# Patient Record
Sex: MALE
[De-identification: names, ages, dates, MRNs, and addresses within clinical notes are randomized per-mention and may not be internally consistent; named-entity substitution may affect disease eponyms.]

---

## 2018-08-26 ENCOUNTER — HOSPITAL ENCOUNTER (INPATIENT)
Dept: HOSPITAL 93 - ER | Age: 53
LOS: 16 days | Discharge: HOME HEALTH | DRG: 572 | End: 2018-09-11
Attending: INTERNAL MEDICINE | Admitting: INTERNAL MEDICINE
Payer: COMMERCIAL

## 2018-08-26 VITALS — BODY MASS INDEX: 34.86 KG/M2 | HEIGHT: 68 IN | WEIGHT: 230 LBS

## 2018-08-26 DIAGNOSIS — E78.4: ICD-10-CM

## 2018-08-26 DIAGNOSIS — E11.22: ICD-10-CM

## 2018-08-26 DIAGNOSIS — B96.89: ICD-10-CM

## 2018-08-26 DIAGNOSIS — I12.9: ICD-10-CM

## 2018-08-26 DIAGNOSIS — E11.65: ICD-10-CM

## 2018-08-26 DIAGNOSIS — N18.3: ICD-10-CM

## 2018-08-26 DIAGNOSIS — L03.116: Primary | ICD-10-CM

## 2018-08-26 DIAGNOSIS — Z95.1: ICD-10-CM

## 2018-08-26 DIAGNOSIS — E66.8: ICD-10-CM

## 2018-08-26 PROCEDURE — B54CZZZ ULTRASONOGRAPHY OF LEFT LOWER EXTREMITY VEINS: ICD-10-PCS | Performed by: INTERNAL MEDICINE

## 2018-08-26 PROCEDURE — 8E0ZXY6 ISOLATION: ICD-10-PCS | Performed by: INTERNAL MEDICINE

## 2018-08-27 PROCEDURE — BQ2SZZZ COMPUTERIZED TOMOGRAPHY (CT SCAN) OF LEFT LOWER EXTREMITY: ICD-10-PCS | Performed by: INTERNAL MEDICINE

## 2018-08-30 PROCEDURE — 0JBP0ZZ EXCISION OF LEFT LOWER LEG SUBCUTANEOUS TISSUE AND FASCIA, OPEN APPROACH: ICD-10-PCS | Performed by: SURGERY

## 2018-08-31 PROCEDURE — 05H333Z INSERTION OF INFUSION DEVICE INTO RIGHT INNOMINATE VEIN, PERCUTANEOUS APPROACH: ICD-10-PCS | Performed by: INTERNAL MEDICINE

## 2018-09-10 PROCEDURE — 0JBP0ZZ EXCISION OF LEFT LOWER LEG SUBCUTANEOUS TISSUE AND FASCIA, OPEN APPROACH: ICD-10-PCS | Performed by: SURGERY

## 2018-09-27 ENCOUNTER — HOSPITAL ENCOUNTER (INPATIENT)
Dept: HOSPITAL 93 - ER | Age: 53
LOS: 5 days | Discharge: HOME HEALTH | DRG: 572 | End: 2018-10-02
Attending: INTERNAL MEDICINE | Admitting: INTERNAL MEDICINE
Payer: COMMERCIAL

## 2018-09-27 VITALS — HEIGHT: 68 IN | WEIGHT: 230 LBS | BODY MASS INDEX: 34.86 KG/M2

## 2018-09-27 DIAGNOSIS — E11.9: ICD-10-CM

## 2018-09-27 DIAGNOSIS — Z16.12: ICD-10-CM

## 2018-09-27 DIAGNOSIS — I10: ICD-10-CM

## 2018-09-27 DIAGNOSIS — Z95.1: ICD-10-CM

## 2018-09-27 DIAGNOSIS — E66.8: ICD-10-CM

## 2018-09-27 DIAGNOSIS — B96.29: ICD-10-CM

## 2018-09-27 DIAGNOSIS — L97.828: Primary | ICD-10-CM

## 2018-09-27 PROCEDURE — 5A09557 ASSISTANCE WITH RESPIRATORY VENTILATION, GREATER THAN 96 CONSECUTIVE HOURS, CONTINUOUS POSITIVE AIRWAY PRESSURE: ICD-10-PCS | Performed by: INTERNAL MEDICINE

## 2018-09-28 PROCEDURE — 8E0ZXY6 ISOLATION: ICD-10-PCS | Performed by: INTERNAL MEDICINE

## 2018-09-28 PROCEDURE — 0JBP0ZZ EXCISION OF LEFT LOWER LEG SUBCUTANEOUS TISSUE AND FASCIA, OPEN APPROACH: ICD-10-PCS | Performed by: SURGERY

## 2018-10-01 PROCEDURE — 02HV33Z INSERTION OF INFUSION DEVICE INTO SUPERIOR VENA CAVA, PERCUTANEOUS APPROACH: ICD-10-PCS | Performed by: INTERNAL MEDICINE

## 2022-05-26 ENCOUNTER — HOSPITAL ENCOUNTER (INPATIENT)
Dept: HOSPITAL 93 - ER | Age: 57
LOS: 12 days | Discharge: HOME | DRG: 809 | End: 2022-06-07
Attending: INTERNAL MEDICINE | Admitting: INTERNAL MEDICINE
Payer: COMMERCIAL

## 2022-05-26 VITALS — BODY MASS INDEX: 37.89 KG/M2 | WEIGHT: 250 LBS | HEIGHT: 68 IN

## 2022-05-26 DIAGNOSIS — D61.810: Primary | ICD-10-CM

## 2022-05-26 DIAGNOSIS — L97.819: ICD-10-CM

## 2022-05-26 DIAGNOSIS — L89.159: ICD-10-CM

## 2022-05-26 DIAGNOSIS — I11.9: ICD-10-CM

## 2022-05-26 DIAGNOSIS — D63.0: ICD-10-CM

## 2022-05-26 DIAGNOSIS — Z74.01: ICD-10-CM

## 2022-05-26 DIAGNOSIS — G89.3: ICD-10-CM

## 2022-05-26 DIAGNOSIS — Z79.84: ICD-10-CM

## 2022-05-26 DIAGNOSIS — D50.0: ICD-10-CM

## 2022-05-26 DIAGNOSIS — Z93.2: ICD-10-CM

## 2022-05-26 DIAGNOSIS — E46: ICD-10-CM

## 2022-05-26 DIAGNOSIS — L97.829: ICD-10-CM

## 2022-05-26 DIAGNOSIS — I25.10: ICD-10-CM

## 2022-05-26 DIAGNOSIS — I83.018: ICD-10-CM

## 2022-05-26 DIAGNOSIS — Z79.4: ICD-10-CM

## 2022-05-26 DIAGNOSIS — D64.9: ICD-10-CM

## 2022-05-26 DIAGNOSIS — Z53.29: ICD-10-CM

## 2022-05-26 DIAGNOSIS — T45.1X5A: ICD-10-CM

## 2022-05-26 DIAGNOSIS — E77.8: ICD-10-CM

## 2022-05-26 DIAGNOSIS — D70.1: ICD-10-CM

## 2022-05-26 DIAGNOSIS — I83.028: ICD-10-CM

## 2022-05-26 DIAGNOSIS — E11.51: ICD-10-CM

## 2022-05-26 DIAGNOSIS — E66.01: ICD-10-CM

## 2022-05-26 DIAGNOSIS — C90.00: ICD-10-CM

## 2022-05-26 DIAGNOSIS — D61.818: ICD-10-CM

## 2022-05-27 PROCEDURE — 30233N1 TRANSFUSION OF NONAUTOLOGOUS RED BLOOD CELLS INTO PERIPHERAL VEIN, PERCUTANEOUS APPROACH: ICD-10-PCS | Performed by: INTERNAL MEDICINE

## 2022-05-27 PROCEDURE — 4A12X45 MONITORING OF CARDIAC ELECTRICAL ACTIVITY, AMBULATORY, EXTERNAL APPROACH: ICD-10-PCS | Performed by: INTERNAL MEDICINE

## 2022-05-27 PROCEDURE — 3E10X8Z IRRIGATION OF SKIN AND MUCOUS MEMBRANES USING IRRIGATING SUBSTANCE: ICD-10-PCS | Performed by: INTERNAL MEDICINE

## 2022-05-28 PROCEDURE — B24BYZZ ULTRASONOGRAPHY OF HEART WITH AORTA USING OTHER CONTRAST: ICD-10-PCS | Performed by: INTERNAL MEDICINE

## 2022-05-31 PROCEDURE — 02H633Z INSERTION OF INFUSION DEVICE INTO RIGHT ATRIUM, PERCUTANEOUS APPROACH: ICD-10-PCS | Performed by: RADIOLOGY

## 2022-08-12 ENCOUNTER — HOSPITAL ENCOUNTER (INPATIENT)
Dept: HOSPITAL 93 - ER | Age: 57
LOS: 13 days | Discharge: HOME | DRG: 812 | End: 2022-08-25
Attending: INTERNAL MEDICINE | Admitting: INTERNAL MEDICINE
Payer: COMMERCIAL

## 2022-08-12 VITALS — WEIGHT: 315 LBS | BODY MASS INDEX: 47.74 KG/M2 | HEIGHT: 68 IN

## 2022-08-12 DIAGNOSIS — D64.9: ICD-10-CM

## 2022-08-12 DIAGNOSIS — Z93.3: ICD-10-CM

## 2022-08-12 DIAGNOSIS — Z74.01: ICD-10-CM

## 2022-08-12 DIAGNOSIS — E66.01: ICD-10-CM

## 2022-08-12 DIAGNOSIS — C90.00: ICD-10-CM

## 2022-08-12 DIAGNOSIS — L89.899: ICD-10-CM

## 2022-08-12 DIAGNOSIS — N39.0: ICD-10-CM

## 2022-08-12 DIAGNOSIS — L89.159: ICD-10-CM

## 2022-08-12 DIAGNOSIS — D50.0: Primary | ICD-10-CM

## 2022-08-12 DIAGNOSIS — Z79.84: ICD-10-CM

## 2022-08-12 DIAGNOSIS — T83.518A: ICD-10-CM

## 2022-08-12 DIAGNOSIS — E11.51: ICD-10-CM

## 2022-08-12 DIAGNOSIS — D64.81: ICD-10-CM

## 2022-08-12 DIAGNOSIS — Z53.29: ICD-10-CM

## 2022-08-12 DIAGNOSIS — I11.9: ICD-10-CM

## 2022-08-12 DIAGNOSIS — B96.1: ICD-10-CM

## 2022-08-13 PROCEDURE — 8E0ZXY6 ISOLATION: ICD-10-PCS | Performed by: INTERNAL MEDICINE

## 2022-08-15 PROCEDURE — 05HY33Z INSERTION OF INFUSION DEVICE INTO UPPER VEIN, PERCUTANEOUS APPROACH: ICD-10-PCS | Performed by: INTERNAL MEDICINE

## 2022-08-15 PROCEDURE — 30233N1 TRANSFUSION OF NONAUTOLOGOUS RED BLOOD CELLS INTO PERIPHERAL VEIN, PERCUTANEOUS APPROACH: ICD-10-PCS | Performed by: INTERNAL MEDICINE

## 2022-08-18 PROCEDURE — 4A12X4Z MONITORING OF CARDIAC ELECTRICAL ACTIVITY, EXTERNAL APPROACH: ICD-10-PCS | Performed by: INTERNAL MEDICINE

## 2023-01-31 ENCOUNTER — HOSPITAL ENCOUNTER (INPATIENT)
Dept: HOSPITAL 93 - ER | Age: 58
LOS: 4 days | Discharge: HOME | DRG: 841 | End: 2023-02-04
Attending: INTERNAL MEDICINE | Admitting: INTERNAL MEDICINE
Payer: COMMERCIAL

## 2023-01-31 VITALS — WEIGHT: 240 LBS | HEIGHT: 68 IN | BODY MASS INDEX: 36.37 KG/M2

## 2023-01-31 DIAGNOSIS — I11.9: ICD-10-CM

## 2023-01-31 DIAGNOSIS — I73.9: ICD-10-CM

## 2023-01-31 DIAGNOSIS — L08.89: ICD-10-CM

## 2023-01-31 DIAGNOSIS — L97.818: ICD-10-CM

## 2023-01-31 DIAGNOSIS — Z20.822: ICD-10-CM

## 2023-01-31 DIAGNOSIS — I87.2: ICD-10-CM

## 2023-01-31 DIAGNOSIS — D50.0: ICD-10-CM

## 2023-01-31 DIAGNOSIS — D63.1: ICD-10-CM

## 2023-01-31 DIAGNOSIS — E78.5: ICD-10-CM

## 2023-01-31 DIAGNOSIS — D63.0: ICD-10-CM

## 2023-01-31 DIAGNOSIS — I25.10: ICD-10-CM

## 2023-01-31 DIAGNOSIS — Z74.01: ICD-10-CM

## 2023-01-31 DIAGNOSIS — B96.4: ICD-10-CM

## 2023-01-31 DIAGNOSIS — B95.61: ICD-10-CM

## 2023-01-31 DIAGNOSIS — C90.00: Primary | ICD-10-CM

## 2023-01-31 DIAGNOSIS — Z95.1: ICD-10-CM

## 2023-01-31 NOTE — NUR
SE RECIBE PTE ALERTA Y ORIENTADO X 3 ESFERAS SIN DIFICULTAD RESP EN AMBULANCIA
POR ANEMIA REFERIDO POR DRA LINDSAY MCMANUS.PTE CON WOODWARD TRAIDO DEL HOGAR QUE PRSENTA
ORINA CHEMA,OSTOMIA EN LADO LT.SE LE OFRECE [place] MEDICO EL CUAL REFIERE ENTENDER
IMCLUYENDO TRANSUSION E INDICA DESEA SE LE REALICE CARI LINEA CENTRAL ANTES
DE [place] MEDICO.SE LE NOTIFICA A DR POLLARD,SE REALIZA EKG PENDIENTE.

## 2023-01-31 NOTE — NUR
1045PM PTE AUTORIZA A VALERI MUESTRAS,SE REALIZA BAJO MEDIDAS ASEPTICAS.SE EVELYNE
TUBOS PILOTOS PARA 4 UNIDADES DE PRBC NOTIFICADAS A MS GARCIA DE [facility]
AUXILIO MUTUO.

## 2023-02-01 PROCEDURE — 30233N1 TRANSFUSION OF NONAUTOLOGOUS RED BLOOD CELLS INTO PERIPHERAL VEIN, PERCUTANEOUS APPROACH: ICD-10-PCS | Performed by: EMERGENCY MEDICINE

## 2023-02-01 PROCEDURE — 06HM33Z INSERTION OF INFUSION DEVICE INTO RIGHT FEMORAL VEIN, PERCUTANEOUS APPROACH: ICD-10-PCS | Performed by: SURGERY

## 2023-02-21 ENCOUNTER — HOSPITAL ENCOUNTER (INPATIENT)
Dept: HOSPITAL 93 - ER | Age: 58
LOS: 11 days | Discharge: HOME | DRG: 871 | End: 2023-03-04
Attending: INTERNAL MEDICINE | Admitting: INTERNAL MEDICINE
Payer: COMMERCIAL

## 2023-02-21 VITALS — WEIGHT: 300 LBS | BODY MASS INDEX: 58.9 KG/M2 | HEIGHT: 60 IN

## 2023-02-21 DIAGNOSIS — A41.1: Primary | ICD-10-CM

## 2023-02-21 DIAGNOSIS — E11.65: ICD-10-CM

## 2023-02-21 DIAGNOSIS — Z92.21: ICD-10-CM

## 2023-02-21 DIAGNOSIS — L89.629: ICD-10-CM

## 2023-02-21 DIAGNOSIS — E87.5: ICD-10-CM

## 2023-02-21 DIAGNOSIS — N17.8: ICD-10-CM

## 2023-02-21 DIAGNOSIS — I87.2: ICD-10-CM

## 2023-02-21 DIAGNOSIS — D64.81: ICD-10-CM

## 2023-02-21 DIAGNOSIS — B37.7: ICD-10-CM

## 2023-02-21 DIAGNOSIS — Z74.01: ICD-10-CM

## 2023-02-21 DIAGNOSIS — Z79.84: ICD-10-CM

## 2023-02-21 DIAGNOSIS — D50.0: ICD-10-CM

## 2023-02-21 DIAGNOSIS — L03.116: ICD-10-CM

## 2023-02-21 DIAGNOSIS — R57.1: ICD-10-CM

## 2023-02-21 DIAGNOSIS — E11.21: ICD-10-CM

## 2023-02-21 DIAGNOSIS — Z95.1: ICD-10-CM

## 2023-02-21 DIAGNOSIS — D69.59: ICD-10-CM

## 2023-02-21 DIAGNOSIS — B96.1: ICD-10-CM

## 2023-02-21 DIAGNOSIS — R65.21: ICD-10-CM

## 2023-02-21 DIAGNOSIS — Z92.3: ICD-10-CM

## 2023-02-21 DIAGNOSIS — E66.01: ICD-10-CM

## 2023-02-21 DIAGNOSIS — Z79.4: ICD-10-CM

## 2023-02-21 DIAGNOSIS — E87.1: ICD-10-CM

## 2023-02-21 DIAGNOSIS — I25.810: ICD-10-CM

## 2023-02-21 DIAGNOSIS — B37.49: ICD-10-CM

## 2023-02-21 DIAGNOSIS — D70.1: ICD-10-CM

## 2023-02-21 DIAGNOSIS — I11.9: ICD-10-CM

## 2023-02-21 DIAGNOSIS — A41.89: ICD-10-CM

## 2023-02-21 DIAGNOSIS — L89.159: ICD-10-CM

## 2023-02-21 DIAGNOSIS — D63.0: ICD-10-CM

## 2023-02-21 DIAGNOSIS — C90.00: ICD-10-CM

## 2023-02-21 DIAGNOSIS — I95.89: ICD-10-CM

## 2023-02-21 DIAGNOSIS — L98.499: ICD-10-CM

## 2023-02-21 DIAGNOSIS — Z93.3: ICD-10-CM

## 2023-02-21 DIAGNOSIS — D61.818: ICD-10-CM

## 2023-02-21 PROCEDURE — 4A12X4Z MONITORING OF CARDIAC ELECTRICAL ACTIVITY, EXTERNAL APPROACH: ICD-10-PCS | Performed by: INTERNAL MEDICINE

## 2023-02-21 PROCEDURE — 3E0F7SF INTRODUCTION OF OTHER GAS INTO RESPIRATORY TRACT, VIA NATURAL OR ARTIFICIAL OPENING: ICD-10-PCS | Performed by: INTERNAL MEDICINE

## 2023-02-21 PROCEDURE — 30233N1 TRANSFUSION OF NONAUTOLOGOUS RED BLOOD CELLS INTO PERIPHERAL VEIN, PERCUTANEOUS APPROACH: ICD-10-PCS | Performed by: INTERNAL MEDICINE

## 2023-02-24 PROCEDURE — 30233R1 TRANSFUSION OF NONAUTOLOGOUS PLATELETS INTO PERIPHERAL VEIN, PERCUTANEOUS APPROACH: ICD-10-PCS | Performed by: INTERNAL MEDICINE

## 2023-02-27 PROCEDURE — 06HM33Z INSERTION OF INFUSION DEVICE INTO RIGHT FEMORAL VEIN, PERCUTANEOUS APPROACH: ICD-10-PCS | Performed by: SURGERY

## 2023-03-16 ENCOUNTER — HOSPITAL ENCOUNTER (INPATIENT)
Dept: HOSPITAL 93 - ER | Age: 58
LOS: 28 days | Discharge: HOME | DRG: 264 | End: 2023-04-13
Attending: INTERNAL MEDICINE | Admitting: INTERNAL MEDICINE
Payer: COMMERCIAL

## 2023-03-16 VITALS — WEIGHT: 300 LBS | BODY MASS INDEX: 45.47 KG/M2 | HEIGHT: 68 IN

## 2023-03-16 DIAGNOSIS — B96.1: ICD-10-CM

## 2023-03-16 DIAGNOSIS — Z16.30: ICD-10-CM

## 2023-03-16 DIAGNOSIS — D50.0: ICD-10-CM

## 2023-03-16 DIAGNOSIS — Z79.4: ICD-10-CM

## 2023-03-16 DIAGNOSIS — G89.3: ICD-10-CM

## 2023-03-16 DIAGNOSIS — E11.622: ICD-10-CM

## 2023-03-16 DIAGNOSIS — D63.0: ICD-10-CM

## 2023-03-16 DIAGNOSIS — B95.2: ICD-10-CM

## 2023-03-16 DIAGNOSIS — B96.4: ICD-10-CM

## 2023-03-16 DIAGNOSIS — I96: ICD-10-CM

## 2023-03-16 DIAGNOSIS — I12.9: ICD-10-CM

## 2023-03-16 DIAGNOSIS — D70.1: ICD-10-CM

## 2023-03-16 DIAGNOSIS — E66.01: ICD-10-CM

## 2023-03-16 DIAGNOSIS — L97.519: ICD-10-CM

## 2023-03-16 DIAGNOSIS — C90.30: ICD-10-CM

## 2023-03-16 DIAGNOSIS — N18.9: ICD-10-CM

## 2023-03-16 DIAGNOSIS — E11.22: ICD-10-CM

## 2023-03-16 DIAGNOSIS — Z53.29: ICD-10-CM

## 2023-03-16 DIAGNOSIS — E11.52: Primary | ICD-10-CM

## 2023-03-16 DIAGNOSIS — Z79.899: ICD-10-CM

## 2023-03-16 DIAGNOSIS — E11.65: ICD-10-CM

## 2023-03-16 DIAGNOSIS — D84.821: ICD-10-CM

## 2023-03-16 DIAGNOSIS — L97.529: ICD-10-CM

## 2023-03-16 DIAGNOSIS — L03.113: ICD-10-CM

## 2023-03-16 DIAGNOSIS — Z93.3: ICD-10-CM

## 2023-03-16 DIAGNOSIS — L89.159: ICD-10-CM

## 2023-03-16 DIAGNOSIS — Z95.5: ICD-10-CM

## 2023-03-16 DIAGNOSIS — Z74.01: ICD-10-CM

## 2023-03-16 DIAGNOSIS — L98.498: ICD-10-CM

## 2023-03-16 DIAGNOSIS — B96.89: ICD-10-CM

## 2023-03-16 DIAGNOSIS — N39.0: ICD-10-CM

## 2023-03-16 DIAGNOSIS — N17.9: ICD-10-CM

## 2023-03-16 PROCEDURE — B54MZZZ ULTRASONOGRAPHY OF RIGHT UPPER EXTREMITY VEINS: ICD-10-PCS | Performed by: INTERNAL MEDICINE

## 2023-03-17 PROCEDURE — 06HM33Z INSERTION OF INFUSION DEVICE INTO RIGHT FEMORAL VEIN, PERCUTANEOUS APPROACH: ICD-10-PCS | Performed by: SURGERY

## 2023-03-18 PROCEDURE — 0JBG0ZZ EXCISION OF RIGHT LOWER ARM SUBCUTANEOUS TISSUE AND FASCIA, OPEN APPROACH: ICD-10-PCS | Performed by: SURGERY

## 2023-03-22 PROCEDURE — 0JBG0ZZ EXCISION OF RIGHT LOWER ARM SUBCUTANEOUS TISSUE AND FASCIA, OPEN APPROACH: ICD-10-PCS | Performed by: SURGERY

## 2023-03-29 PROCEDURE — 0JDG0ZZ EXTRACTION OF RIGHT LOWER ARM SUBCUTANEOUS TISSUE AND FASCIA, OPEN APPROACH: ICD-10-PCS | Performed by: SURGERY

## 2023-04-03 PROCEDURE — 30233N1 TRANSFUSION OF NONAUTOLOGOUS RED BLOOD CELLS INTO PERIPHERAL VEIN, PERCUTANEOUS APPROACH: ICD-10-PCS | Performed by: INTERNAL MEDICINE

## 2023-04-04 PROCEDURE — 0JBG0ZZ EXCISION OF RIGHT LOWER ARM SUBCUTANEOUS TISSUE AND FASCIA, OPEN APPROACH: ICD-10-PCS | Performed by: SURGERY

## 2023-04-05 PROCEDURE — 0JDG0ZZ EXTRACTION OF RIGHT LOWER ARM SUBCUTANEOUS TISSUE AND FASCIA, OPEN APPROACH: ICD-10-PCS | Performed by: SURGERY

## 2023-04-05 PROCEDURE — 2W1CX6Z COMPRESSION OF RIGHT LOWER ARM USING PRESSURE DRESSING: ICD-10-PCS | Performed by: INTERNAL MEDICINE

## 2023-04-10 PROCEDURE — 2W0CX6Z CHANGE PRESSURE DRESSING ON RIGHT LOWER ARM: ICD-10-PCS | Performed by: INTERNAL MEDICINE

## 2023-04-12 PROCEDURE — 0JDG0ZZ EXTRACTION OF RIGHT LOWER ARM SUBCUTANEOUS TISSUE AND FASCIA, OPEN APPROACH: ICD-10-PCS | Performed by: SURGERY

## 2023-07-22 ENCOUNTER — HOSPITAL ENCOUNTER (INPATIENT)
Dept: HOSPITAL 93 - ER | Age: 58
LOS: 7 days | Discharge: HOME | DRG: 871 | End: 2023-07-29
Attending: GENERAL PRACTICE | Admitting: GENERAL PRACTICE
Payer: COMMERCIAL

## 2023-07-22 DIAGNOSIS — B37.49: ICD-10-CM

## 2023-07-22 DIAGNOSIS — R65.21: ICD-10-CM

## 2023-07-22 DIAGNOSIS — E03.9: ICD-10-CM

## 2023-07-22 DIAGNOSIS — L98.499: ICD-10-CM

## 2023-07-22 DIAGNOSIS — B37.7: ICD-10-CM

## 2023-07-22 DIAGNOSIS — E11.22: ICD-10-CM

## 2023-07-22 DIAGNOSIS — E66.01: ICD-10-CM

## 2023-07-22 DIAGNOSIS — N18.9: ICD-10-CM

## 2023-07-22 DIAGNOSIS — C90.00: ICD-10-CM

## 2023-07-22 DIAGNOSIS — N50.89: ICD-10-CM

## 2023-07-22 DIAGNOSIS — Z79.4: ICD-10-CM

## 2023-07-22 DIAGNOSIS — E83.42: ICD-10-CM

## 2023-07-22 DIAGNOSIS — I13.10: ICD-10-CM

## 2023-07-22 DIAGNOSIS — E11.628: ICD-10-CM

## 2023-07-22 DIAGNOSIS — L89.154: ICD-10-CM

## 2023-07-22 DIAGNOSIS — D63.0: ICD-10-CM

## 2023-07-22 DIAGNOSIS — Z93.3: ICD-10-CM

## 2023-07-22 DIAGNOSIS — E11.622: ICD-10-CM

## 2023-07-22 DIAGNOSIS — Z74.01: ICD-10-CM

## 2023-07-22 DIAGNOSIS — A41.51: Primary | ICD-10-CM

## 2023-07-22 DIAGNOSIS — D50.0: ICD-10-CM

## 2023-07-22 DIAGNOSIS — E88.09: ICD-10-CM

## 2023-07-22 DIAGNOSIS — N17.8: ICD-10-CM

## 2023-07-22 DIAGNOSIS — Z92.21: ICD-10-CM

## 2023-07-22 DIAGNOSIS — E11.65: ICD-10-CM

## 2023-07-23 PROCEDURE — 4A12X4Z MONITORING OF CARDIAC ELECTRICAL ACTIVITY, EXTERNAL APPROACH: ICD-10-PCS | Performed by: INTERNAL MEDICINE

## 2023-07-23 PROCEDURE — 3E0F7SF INTRODUCTION OF OTHER GAS INTO RESPIRATORY TRACT, VIA NATURAL OR ARTIFICIAL OPENING: ICD-10-PCS | Performed by: INTERNAL MEDICINE

## 2023-07-23 PROCEDURE — 30243N1 TRANSFUSION OF NONAUTOLOGOUS RED BLOOD CELLS INTO CENTRAL VEIN, PERCUTANEOUS APPROACH: ICD-10-PCS | Performed by: INTERNAL MEDICINE

## 2023-07-23 NOTE — NUR
SE LIDA REQUISICION DE DEVAN EN LABORATORIO. SE LLAMA A [facility] Y SE
NOTIFICA A MS GARCIA 3:25AM QUE SE DEJO REQUISICION EN EL LABORATORIO.

## 2023-07-23 NOTE — NUR
7:00AM
SE RECIBE PTE ALERTA Y ORIENTADO X3 EN CAMA BAJA CON BARANDA ELEVADAS. PTE
CANALIZADO EN EL LADO DERECHO CON ANGIO #20 RECIBIENDO LEVOPHED 8MG/250ML
BAJANDO A 10ML/HR Y ANGIO #22 RECIBIENDO .9NSS BAJANDO A 120ML/HR.
EXTREMIDADES SUPERIORES CON EDEMA Y ULCERA EN BRAZO DERECHO DERECHO CONECTADA A
SUCCION. PTE CONECTADO A MONITOR CARDIACO HR:76LAT/ MIN SAT:99 RR:13. ABDOMEN
BLANDO AL TACTO. VENDAJE EN EL CENTRO DEL PECHO LIMPIO Y SECO. PTE CON
COLONOSTOMIA EN LUQ. SE OBSEWRVA WOODWARD DRENANDO ORINA COLOR AMRILLO TURBIO CON
SEDIMENTACIO CON IRRIGACION. EXTREMIDADES INFERIORES CON EDEMAS, ERITEMAS,
CALIENTE AL TACTO Y ELIMINANDO SECRECIONES COLOR AMARILLO, FETIDO. SE MIDEN S/V
Y SE REPORTAN. PACIENTE EN ESPERA POR EL DR. TEETEE ANDRADE.

## 2023-07-23 NOTE — NUR
SE ORIENTA A PACIENTE SOBRE ORDEN MEDICA DE TRANFUSION EL MISMO REHUSA
TRANFUSION HASTA QUE MEDICO DE TURNO SE COMUNICO CON PASTRANA ONCOLOGA. SE LE
NOTIFICA A

## 2023-07-23 NOTE — NUR
SE LLAMA A [facility] A NOTIFICAR CAMBIO EN ORDEN MEDIA MS LYLY ME
NOTIFICA QUE PACIENTE ES A POSITIVO QUE NO SE UTILIZAN GRUPOS ALTERNOS.

## 2023-07-23 NOTE — NUR
SE RECIBE PTE ALERTA Y ORIENTADO EN JUVENTINO SHIRA ESFERAS, EL CUAL LLEGA EN
AMBULANCIA, EN COMPANIA DE ESPOSA, QUIEN REFIERE HIPOTENSION Y FIEBRE. PTE
PRESENTA TEMPERATURA 101.0, DXT-311MG/DL. FAMILIAR Y PTE AMBOS INDICAN
DEBILIDAD EN BRAZO LT Y QUE EL MISMO NO SE PUEDE UTILIZAR. PTE CON OSTOMIA EN
LUQ. ULCERA EN BRAZO RT CONECTADA A SUCCION. SE OBSERVAN PIERNAS CON EDEMA,
ERITEMA, CALIENTES AL TACTO Y ELIMINANDO SECRECIONES COLOR AMARILLO, FETIDO.
PTE REFIERE ULCERA SACRAL, REID EL MISMO REHUSA EL QUE SE CAMBIE DE POSICION
PARA EVALUAR LA MISMA. PTE CON WOODWARD CON FECHA DE 07/17/23 SE OBSERVA EL MISMO
CON ORINA AMARILLO TURBIO CON SEDIMENTACION Y CON IRRIGACION CONTINUA. SE
REALIZA EKG, SE PRESENTA A DR GARCIA Y SE UBICA EN AREA DE CRITICO,
CONECTADO A MONITOR CARDIACO Y OXIMETRIA DE PULSO. 
SE OBSERVA BENDAJE EN EL CENTRO DEL PECHO EL MISMO LIMPIO Y SECO PTE REFIERE
QUE TUVO OPERACION CARDIACA LA CUAL SE LE ABRIO EN NOVIEMBRE 2022.

## 2023-07-23 NOTE — NUR
SE RECIBE PACIENTE AL AREA DE CRITICO #2, SE COLOCA EN HELEN #3. SE CONECTA A
MONITOR CARDIACO CON SATUROMETRO. SE MIDEN S/V Y SE NOTIFICA B/P A DR GARCIA
SE ORIENTA PACIENTE DE TRATAMIENTO LIVE ORDEN MEDICA. REFIERE ENTENDER. SE
REALIZAN MUESTRAS CON MEDIDAS ASEPTICAS CORRESPONDIENTES. SE INTENTA CANALIZAR
EN 2 OCASIONES NO ES POSIBLE POR LO CUAL SE INDICA A MIS ERIKA RN LA CUAL
INTENTA EN 4 OCASIONES Y NO FUE POSIBLE CANALIZAR PACIENTE SE NOTIFICA A DR GARCIA. SE NOTIFICA DEXTRO EN 311MG/DL A DR GARCIA,NO EMITE ORDENES. 
PACIENTE CON BARANDAS ELEVADAS POR PASTRANA SEGURIDAD. SE MONITOREA EN TURNO POR
CAMBIOS SIGNIFICATIVOS B/P Y DEXTRO.

## 2023-07-23 NOTE — NUR
9:30AM'
SE LLAMA A [facility] Y SE LE COMUNICA A PROSPER. BROWN LA ORDEN DE 2PRBC
FRANCIONADA. SE ENVIA REQUISION DE 2PRBC FRANCIONADA POR FAX A [facility]
Y A LABORATORIO. MUESTRA PREVIA VIGENTE EN BANCO.

## 2023-07-24 PROCEDURE — 05HM33Z INSERTION OF INFUSION DEVICE INTO RIGHT INTERNAL JUGULAR VEIN, PERCUTANEOUS APPROACH: ICD-10-PCS | Performed by: RADIOLOGY

## 2023-07-24 PROCEDURE — 8E0ZXY6 ISOLATION: ICD-10-PCS | Performed by: INTERNAL MEDICINE

## 2023-07-24 PROCEDURE — BV44ZZZ ULTRASONOGRAPHY OF SCROTUM: ICD-10-PCS | Performed by: STUDENT IN AN ORGANIZED HEALTH CARE EDUCATION/TRAINING PROGRAM

## 2023-07-24 PROCEDURE — B543ZZA ULTRASONOGRAPHY OF RIGHT JUGULAR VEINS, GUIDANCE: ICD-10-PCS | Performed by: RADIOLOGY

## 2024-01-19 ENCOUNTER — HOSPITAL ENCOUNTER (INPATIENT)
Dept: HOSPITAL 93 - ER | Age: 59
LOS: 12 days | DRG: 802 | End: 2024-01-31
Attending: INTERNAL MEDICINE | Admitting: INTERNAL MEDICINE
Payer: COMMERCIAL

## 2024-01-19 VITALS — HEIGHT: 69 IN | WEIGHT: 300 LBS | BODY MASS INDEX: 44.43 KG/M2

## 2024-01-19 DIAGNOSIS — B96.5: ICD-10-CM

## 2024-01-19 DIAGNOSIS — L89.159: ICD-10-CM

## 2024-01-19 DIAGNOSIS — L03.113: ICD-10-CM

## 2024-01-19 DIAGNOSIS — E03.9: ICD-10-CM

## 2024-01-19 DIAGNOSIS — A41.9: ICD-10-CM

## 2024-01-19 DIAGNOSIS — B96.1: ICD-10-CM

## 2024-01-19 DIAGNOSIS — Z79.4: ICD-10-CM

## 2024-01-19 DIAGNOSIS — D64.9: Primary | ICD-10-CM

## 2024-01-19 DIAGNOSIS — C90.00: ICD-10-CM

## 2024-01-19 DIAGNOSIS — E78.5: ICD-10-CM

## 2024-01-19 DIAGNOSIS — Z95.1: ICD-10-CM

## 2024-01-19 DIAGNOSIS — E66.01: ICD-10-CM

## 2024-01-19 DIAGNOSIS — E86.0: ICD-10-CM

## 2024-01-19 DIAGNOSIS — R57.1: ICD-10-CM

## 2024-01-19 DIAGNOSIS — I11.9: ICD-10-CM

## 2024-01-19 DIAGNOSIS — I83.009: ICD-10-CM

## 2024-01-19 DIAGNOSIS — N39.0: ICD-10-CM

## 2024-01-19 DIAGNOSIS — I46.9: ICD-10-CM

## 2024-01-19 DIAGNOSIS — E11.65: ICD-10-CM

## 2024-01-19 DIAGNOSIS — D69.6: ICD-10-CM

## 2024-01-19 DIAGNOSIS — Z74.01: ICD-10-CM

## 2024-01-19 DIAGNOSIS — I25.10: ICD-10-CM

## 2024-01-19 LAB
ALBUMIN SERPL-MCNC: 1.7 GM/DL (ref 3.4–5)
ALP SERPL-CCNC: 88 U/L (ref 50–136)
ALT SERPL-CCNC: 11 U/L (ref 12–78)
ANION GAP SERPL CALC-SCNC: 12 MMOL/L (ref 10–20)
APTT PPP: 32.9 SECONDS (ref 22–34)
BACTERIA UR QL AUTO: 3715.6 UL (ref 0–1933)
BASOPHILS NFR BLD AUTO: 0.1 % (ref 0–1.5)
BILIRUB SERPL-MCNC: 0.37 MG/DL (ref 0.3–1.2)
BUN SERPL-MCNC: 35 MG/DL (ref 7–18)
BUN/CREAT SERPL: 30 (ref 7–25)
CALCIUM SERPL-MCNC: 7.8 MG/DL (ref 8.5–10.1)
CHLORIDE SERPL-SCNC: 99 MMOL/L (ref 98–107)
CO2 SERPL-SCNC: 24 MEQ/L (ref 21–32)
CREAT SERPL-MCNC: 1.16 MG/DL (ref 0.7–1.3)
EGFRCR SERPLBLD CKD-EPI 2021: 64.22 ML/MIN/{1.73_M2}
EOSINOPHIL NFR BLD AUTO: 0.1 % (ref 0–7)
EPI CELLS URNS QL MICRO: 9.7 UL (ref 0–38.8)
ERYTHROCYTE [DISTWIDTH] IN BLOOD BY AUTOMATED COUNT: 21.2 % (ref 11.5–14.5)
GLOBULIN SER CALC-MCNC: 2.5 G/DL (ref 2.4–3.5)
GLUCOSE SERPL-MCNC: 683 MG/DL (ref 65–100)
GLUCOSE UR STRIP-MCNC: >=1000 MG/DL
HCT VFR BLD AUTO: 19.2 % (ref 39–48)
HGB BLD-MCNC: 5.9 G/DL (ref 13–16)
INR PPP: 1.13
LYMPHOCYTES NFR BLD AUTO: 4.2 % (ref 12–44)
MCH RBC QN AUTO: 23.7 PG (ref 27–32)
MCHC RBC AUTO-ENTMCNC: 30.4 G/DL (ref 32–36)
MCV RBC AUTO: 77.7 FL (ref 80–100)
MONOCYTES NFR BLD AUTO: 5.4 % (ref 2–10)
NEUTROPHILS NFR BLD AUTO: 90.2 % (ref 47–76)
OSMOLALITY SERPL: 303 MOSM/KG (ref 275–295)
PH UR: 5 [PH] (ref 5–8)
PLATELET # BLD AUTO: 93 K/UL (ref 150–450)
PMV BLD AUTO: 10 FL (ref 7.2–11.1)
POTASSIUM SERPL-SCNC: 3.79 MEQ/L (ref 3.5–5.1)
PROT SERPL-MCNC: 4.2 GM/DL (ref 6.4–8.2)
PROT UR STRIP-MCNC: 30 MG/DL
PROTHROMBIN TIME: 11.8 SECONDS (ref 9–11.5)
RBC # BLD AUTO: 2.48 M/UL (ref 4–6)
RBC #/AREA URNS AUTO: 19.9 UL (ref 0–20.8)
SCC AG SERPL-SCNC: 4 U/L (ref 15–37)
SODIUM SERPL-SCNC: 131 MMOL/L (ref 136–145)
SP GR UR STRIP: 1.02 (ref 1–1.03)
UROBILINOGEN UR STRIP-MCNC: 0.2 E.U./DL
WBC # BLD AUTO: 7.8 K/UL (ref 4.5–11)
WBC URNS QL MICRO: 3663.4 UL (ref 0–23.2)
YEAST #/AREA URNS HPF: (no result) /HPF

## 2024-01-19 NOTE — NUR
SE RECIBE PACIENTE EN AMBULANCIA ALERTA Y ORIENTADO X3, REFIERE HABERSE
REALIZADO UN DEXTROSTICK OBTENIENDO 600 MG/DL PARAMEDICOS INDICAN QUE
ADMINISTRARON 20 UNIDADES SUBQ. SE EVELYNE VS, SE REALIZA DXT Y SE UBICA

## 2024-01-19 NOTE — NUR
20:30
SE RECIBE PTE ALERTA Y ORIENTADO X3 AL CUBICULO #2 POR ORDEN DE DR[address] GARCIA
PTE CONECTADO A MONITOR CARDIACO Y OXIMETRIA CON DIAGNOSTICO DE ANEMIA, SE
OBSERVA EXTREMIDADES SUP CON EDEMAS SIN LACERACION ANGIO PTE REHUZA EL MISMO
CON ABDOMEN Y DEPRESIBLE AL TACTO CON EXT INFERIORES EDEMATOSAS SIN LACERACION
CON IRRIGACION CONTINUA CONECTADO AL WOODWARD. SE ANNEMARIE DESMOND PREVENTIVA POR ALGUN
CAMBIO SIGNIFICATIVO.
 
21:16
 
SE REALIZA DEXTRO LA CUAL TIENE 501 SE NOTIFICA A DRA. LOYA INTERNISTA
 
 
21:19 
PERSONA DE LAB LLAMA PARA NOTIFICAR QUE LA DEVAN LLEGGO, SE ORIENTA PTE
SOBRE LA IMPORTANCIA DE CANALIZARLO EL MISMO REFIERE REHUZAR [place] VA
ESPERAR POR LA LINEA CENTRAL.

## 2024-01-19 NOTE — NUR
SE RECIBE PTE ALERTA ORIENTADO X3 EN CAMA CON BARANDAS ELEVADAS POR PASTRANA
SEGURIDAD.SE EVELYNE MUESTRAS DE LABORATORIO USANDO MEDIDAS ASEPTICAS.PTE REHUSA
CANALIZACION YA QUE DESEA ALOK LINEA CENTRAL Y TX AL MOMENTO.SE ORIENTA PTE
SOBRE OBJETIVO DE [place] MEDICO.SE REALIZA REQUISION DE 3 UNIDADES DE PRBC
FRACCIONADAS.

## 2024-01-20 LAB
ALBUMIN SERPL-MCNC: 1.8 GM/DL (ref 3.4–5)
ALP SERPL-CCNC: 91 U/L (ref 50–136)
ALT SERPL-CCNC: 11 U/L (ref 12–78)
ANION GAP SERPL CALC-SCNC: 14 MMOL/L (ref 10–20)
APTT PPP: 26.8 SECONDS (ref 22–34)
BASOPHILS NFR BLD AUTO: 0.1 % (ref 0–1.5)
BILIRUB DIRECT SERPL-MCNC: 0.1 MG/DL (ref 0–0.2)
BILIRUB SERPL-MCNC: 0.31 MG/DL (ref 0.3–1.2)
BUN SERPL-MCNC: 35 MG/DL (ref 7–18)
BUN/CREAT SERPL: 32 (ref 7–25)
CALCIUM SERPL-MCNC: 8.1 MG/DL (ref 8.5–10.1)
CHLORIDE SERPL-SCNC: 99 MMOL/L (ref 98–107)
CHOLEST SERPL-MCNC: 89 MG/DL (ref 0–200)
CHOLEST/HDLC SERPL: 1.7 {RATIO} (ref 0–5)
CO2 SERPL-SCNC: 22 MEQ/L (ref 21–32)
CREAT SERPL-MCNC: 1.08 MG/DL (ref 0.7–1.3)
EGFRCR SERPLBLD CKD-EPI 2021: 69.74 ML/MIN/{1.73_M2}
EOSINOPHIL NFR BLD AUTO: 3 % (ref 0–7)
ERYTHROCYTE [DISTWIDTH] IN BLOOD BY AUTOMATED COUNT: 21.4 % (ref 11.5–14.5)
ERYTHROCYTE [SEDIMENTATION RATE] IN BLOOD: > 130 MM/HR
GLOBULIN SER CALC-MCNC: 2.8 G/DL (ref 2.4–3.5)
GLUCOSE SERPL-MCNC: 399 MG/DL (ref 65–100)
HCT VFR BLD AUTO: 21.1 % (ref 39–48)
HDLC SERPL-MCNC: 52 MG/DL (ref 40–60)
HGB BLD-MCNC: 6.7 G/DL (ref 13–16)
INR PPP: 1.06
LDLC SERPL CALC-MCNC: 0 MG/DL (ref 0–130)
LYMPHOCYTES NFR BLD AUTO: 8 % (ref 12–44)
MCH RBC QN AUTO: 24.1 PG (ref 27–32)
MCHC RBC AUTO-ENTMCNC: 31.7 G/DL (ref 32–36)
MCV RBC AUTO: 76.2 FL (ref 80–100)
MONOCYTES NFR BLD AUTO: 13.7 % (ref 2–10)
NEUTROPHILS NFR BLD AUTO: 75.2 % (ref 47–76)
OSMOLALITY SERPL: 289 MOSM/KG (ref 275–295)
PLATELET # BLD AUTO: 209 K/UL (ref 150–450)
PMV BLD AUTO: 9.6 FL (ref 7.2–11.1)
POTASSIUM SERPL-SCNC: 3.4 MEQ/L (ref 3.5–5.1)
PROT SERPL-MCNC: 4.6 GM/DL (ref 6.4–8.2)
PROTHROMBIN TIME: 11.1 SECONDS (ref 9–11.5)
RBC # BLD AUTO: 2.77 M/UL (ref 4–6)
RH BLD: POSITIVE
SCC AG SERPL-SCNC: 7 U/L (ref 15–37)
SODIUM SERPL-SCNC: 132 MMOL/L (ref 136–145)
TRIGL SERPL-MCNC: 185 MG/DL (ref 0–150)
VLDLC SERPL CALC-MCNC: 37 MG/DL (ref 0–39)
WBC # BLD AUTO: 9.7 K/UL (ref 4.5–11)

## 2024-01-20 PROCEDURE — 30233N1 TRANSFUSION OF NONAUTOLOGOUS RED BLOOD CELLS INTO PERIPHERAL VEIN, PERCUTANEOUS APPROACH: ICD-10-PCS

## 2024-01-24 PROCEDURE — 0JBF3ZX EXCISION OF LEFT UPPER ARM SUBCUTANEOUS TISSUE AND FASCIA, PERCUTANEOUS APPROACH, DIAGNOSTIC: ICD-10-PCS | Performed by: SURGERY

## 2024-01-24 PROCEDURE — 07B63ZX EXCISION OF LEFT AXILLARY LYMPHATIC, PERCUTANEOUS APPROACH, DIAGNOSTIC: ICD-10-PCS | Performed by: SURGERY

## 2024-01-25 LAB
ALBUMIN SERPL-MCNC: 1.6 GM/DL (ref 3.4–5)
ALP SERPL-CCNC: 84 U/L (ref 50–136)
ALT SERPL-CCNC: 12 U/L (ref 12–78)
ANION GAP SERPL CALC-SCNC: 12 MMOL/L (ref 10–20)
BILIRUB SERPL-MCNC: 0.32 MG/DL (ref 0.3–1.2)
BUN SERPL-MCNC: 25 MG/DL (ref 7–18)
BUN/CREAT SERPL: 32 (ref 7–25)
CALCIUM SERPL-MCNC: 7.6 MG/DL (ref 8.5–10.1)
CHLORIDE SERPL-SCNC: 107 MMOL/L (ref 98–107)
CO2 SERPL-SCNC: 23 MEQ/L (ref 21–32)
CREAT SERPL-MCNC: 0.77 MG/DL (ref 0.7–1.3)
EGFRCR SERPLBLD CKD-EPI 2021: 103.05 ML/MIN/{1.73_M2}
GLOBULIN SER CALC-MCNC: 2.2 G/DL (ref 2.4–3.5)
GLUCOSE SERPL-MCNC: 235 MG/DL (ref 65–100)
OSMOLALITY SERPL: 288 MOSM/KG (ref 275–295)
POTASSIUM SERPL-SCNC: 3.52 MEQ/L (ref 3.5–5.1)
PROT SERPL-MCNC: 3.8 GM/DL (ref 6.4–8.2)
SCC AG SERPL-SCNC: 9 U/L (ref 15–37)
SODIUM SERPL-SCNC: 138 MMOL/L (ref 136–145)

## 2024-01-26 LAB
BASOPHILS NFR BLD AUTO: 0.7 % (ref 0–1.5)
EOSINOPHIL NFR BLD AUTO: 1 % (ref 0–7)
ERYTHROCYTE [DISTWIDTH] IN BLOOD BY AUTOMATED COUNT: 18.8 % (ref 11.5–14.5)
HCT VFR BLD AUTO: 31.8 % (ref 39–48)
HGB BLD-MCNC: 10.2 G/DL (ref 13–16)
LYMPHOCYTES NFR BLD AUTO: 7.3 % (ref 12–44)
MCH RBC QN AUTO: 24.8 PG (ref 27–32)
MCHC RBC AUTO-ENTMCNC: 32 G/DL (ref 32–36)
MCV RBC AUTO: 77.6 FL (ref 80–100)
MONOCYTES NFR BLD AUTO: 12.1 % (ref 2–10)
NEUTROPHILS NFR BLD AUTO: 78.9 % (ref 47–76)
PLATELET # BLD AUTO: 156 K/UL (ref 150–450)
PMV BLD AUTO: 9.3 FL (ref 7.2–11.1)
RBC # BLD AUTO: 4.1 M/UL (ref 4–6)
WBC # BLD AUTO: 14.5 K/UL (ref 4.5–11)

## 2024-01-30 LAB
BASE EXCESS BLD CALC-SCNC: -5.6 MMOL/L
HCO3 BLDA-SCNC: 14.1 MMOL/L (ref 23–25)
O2 CT BLDA-SCNC: 32 %
PH BLDA: 7.52 [PH] (ref 7.35–7.45)
PO2 BLDA: 189.6 MMHG (ref 80–100)

## 2024-01-30 PROCEDURE — 4A12X4Z MONITORING OF CARDIAC ELECTRICAL ACTIVITY, EXTERNAL APPROACH: ICD-10-PCS | Performed by: INTERNAL MEDICINE
